# Patient Record
Sex: MALE | Race: OTHER | URBAN - METROPOLITAN AREA
[De-identification: names, ages, dates, MRNs, and addresses within clinical notes are randomized per-mention and may not be internally consistent; named-entity substitution may affect disease eponyms.]

---

## 2021-08-17 ENCOUNTER — EMERGENCY (EMERGENCY)
Facility: HOSPITAL | Age: 41
LOS: 0 days | Discharge: HOME | End: 2021-08-17
Payer: SELF-PAY

## 2021-08-17 VITALS
RESPIRATION RATE: 17 BRPM | HEIGHT: 69 IN | DIASTOLIC BLOOD PRESSURE: 85 MMHG | OXYGEN SATURATION: 100 % | HEART RATE: 58 BPM | SYSTOLIC BLOOD PRESSURE: 141 MMHG | WEIGHT: 205.03 LBS | TEMPERATURE: 98 F

## 2021-08-17 DIAGNOSIS — I83.891 VARICOSE VEINS OF RIGHT LOWER EXTREMITY WITH OTHER COMPLICATIONS: ICD-10-CM

## 2021-08-17 PROCEDURE — 99283 EMERGENCY DEPT VISIT LOW MDM: CPT

## 2021-08-17 NOTE — ED PROVIDER NOTE - PATIENT PORTAL LINK FT
You can access the FollowMyHealth Patient Portal offered by [facility] by registering at the following website: [email or web address]. By joining [employer]’s FollowMyHealth portal, you will also be able to view your health information using other applications (apps) compatible with our system.

## 2021-08-17 NOTE — ED PROVIDER NOTE - OBJECTIVE STATEMENT
42 yo m pw bleeding along the the R scrotum with bleeding not stopping after 30 min oozing began after pt popped a pimple on the R scrotum in the shower. No lightheadedness, no loc.    I have reviewed available current nursing and previous documentation of past medical, surgical, family, and/or social history.

## 2021-08-17 NOTE — ED PROVIDER NOTE - NSFOLLOWUPINSTRUCTIONS_ED_ALL_ED_FT
Medical Clearance    A medical screening exam has been done. This exam helps find the cause of your problem and determines whether you need emergency treatment. Your exam has shown that you do not need emergency treatment at this point. It is safe for you to go to your caregiver's office or clinic for treatment. You should make an appointment today to see your caregiver as soon as he or she is available.

## 2021-08-17 NOTE — ED PROVIDER NOTE - CLINICAL SUMMARY MEDICAL DECISION MAKING FREE TEXT BOX
pt pw bleeding varicose vein of R scrotum, bleeding controlled in the ED with surgicel and figure 8.

## 2021-08-17 NOTE — ED PROVIDER NOTE - CARE PROVIDER_API CALL
Gurwinder Austin)  Urology  [address] [place] [address]  Phone: [phone]  Fax: [phone]  Follow Up Time:

## 2021-08-17 NOTE — ED PROVIDER NOTE - PHYSICAL EXAMINATION
Physical Exam    Vital Signs: I have reviewed the initial vital signs.  Constitutional: well-nourished, appears stated age, no acute distress  Eyes: PERRLA, and symmetrical lids.  ENT: Neck supple with no adenopathy, moist MM.  Cardiovascular: regular rate, regular rhythm, well-perfused extremities  Respiratory: unlabored respiratory effort, clear to auscultation bilaterally  Gastrointestinal: soft, non-tender abdomen, no pulsatile mass  : +bleeding varicose vein on the R scrotum oozing, nl scrotum, no lesions, no penile discharge, nontender exam.  Musculoskeletal: supple neck, no lower extremity edema  Integumentary: warm, dry, no rash

## 2021-08-17 NOTE — ED ADULT TRIAGE NOTE - CHIEF COMPLAINT QUOTE
pt a&O x4, pt c.o of small pimple on testicle that he attempted to pop. ongoing bleeding x 1 hour. pt holding pressure now with tissue.  no blood thinners or aspirin  use.

## 2021-08-17 NOTE — ED PROVIDER NOTE - NS ED ROS FT
Review of Systems    Constitutional: (-) fever  Eyes/ENT: (-) sore throat, (-) ear pain  Cardiovascular: (-) chest pain, (-) palpitation   Respiratory: (-) cough, (-) shortness of breath  Gastrointestinal: (-) abdominal pain (-) vomiting, (-) diarrhea  Musculoskeletal: (-) neck pain, (-) back pain, (-) joint pain  Integumentary: (-) rash, (-) edema  Neurological: (-) headache, (-) altered mental status  Heme/Lymph: (-) easy bruising (-) easy bleeding (-) lymphadenopathy  Allergic/Immunologic: (-) pruritus